# Patient Record
(demographics unavailable — no encounter records)

---

## 2017-02-16 NOTE — ED.PDOC
General


ED Provider: 


Dr. MARGOT BOSCH-ER





Chief Complaint: Toe Pain/Injury


Stated Complaint: hes got an infection


Time Seen by Physician: 17:13


Mode of Arrival: Walk-In


Information Source: Patient


Exam Limitations: No limitations


Primary Care Provider: 


PRAVEENA STEEL





Nursing and Triage Documentation Reviewed and Agree: Yes





Skin Complaint Exam





- Skin/Soft Tissue Complaint/Exam


Onset/Duration: 24hrs


Symptoms Are: Still present


Timing: Intermittent


Initial Severity: Mild


Current Severity: Mild


Location: right large toe


Character: Reports: Redness, Swelling, Raised


Aggravating: Reports: None


Alleviating: Reports: None


Associated Signs and Symptoms: Reports: Tenderness.  Denies: Fever, Chills, 

Itching, Drainage, Bruising, Red streaks, Joint swelling


Related Surgical History: Reports: None


Recent Exposure to Others w/Similar Symptoms: No


Skin Findings: Present: Erythema, Pustules


Joint Tenderness Present: No


Differential Diagnoses: Cellulitis, Other





Review of Systems





- Review Of Systems


Constitutional: Reports: No symptoms


Eyes: Reports: No symptoms


Ears, Nose, Mouth, Throat: Reports: No symptoms


Respiratory: Reports: No symptoms


Cardiovascular: Reports: No symptoms


Gastrointestinal: Reports: No symptoms, Other


Genitourinary: Reports: No symptoms


Musculoskeletal: Reports: No symptoms


Skin: Reports: Other


Neurological: Reports: No symptoms


All Other Systems: Reviewed and Negative





Past Medical History





- Past Medical History


Previously Healthy: Yes


Birth Weight: 7 lb 14 oz


Birth History: Normal


ENT: Reports: None


Respiratory: Reports: None


GI/: Reports: None


Chronic Illness: Reports: None





- Surgical History


General Surgical History: Reports: Appendectomy





- Family History


Family History: Reports: Unknown





- Social History


Smoking Status: Never smoker


Exposure to Passive Smoke: No


Infectious Exposure: No


Lives With: Parents





Physical Exam





- Physical Exam


Appearance: Well-appearing


Eyes: Conjunctiva clear


ENT: Ears normal, Nose normal, Mouth normal, Moist mucous membranes, Throat 

normal


Neck: Supple, Nontender, No Lymphadenopathy


Respiratory: Airway patent, Breath sounds clear, Breath sounds equal, 

Respirations nonlabored


Cardiovascular: RRR, No murmur, Pulses normal, Brisk capillary refill


GI/: Soft, Nontender, No masses, Bowel sounds normal, No Organomegaly


Musculoskeletal: Strength intact, ROM intact, No edema


Skin: Warm


Neurological: Alert, Muscle tone normal


Psychiatric: Responds appropriately





Critical Care Note





- Critical Care Note


Total Time (mins): 0





Course





- Course


Vital Signs: 





 











  Temp Pulse Resp Pulse Ox


 


 02/16/17 17:04  98.1 F  124  24  96














Departure





- Departure


Time of Disposition: 17:14


Disposition: HOME SELF-CARE


Discharge Problem: 


Cellulitis


Qualifiers:


 Site of cellulitis: unspecified site Qualifier Code: (L03.90) Cellulitis, 

unspecified





Instructions:  Cellulitis (ED)


Condition: Good


Pt referred to PMD for follow-up: Yes


Additional Instructions: 


cefzil 125/5 1 tsp bid x 7days--wash with soap and water bid --f/u with pcp


Allergies/Adverse Reactions: 


Allergies





No Known Allergies Allergy (Unverified 04/10/16 16:09)


 








Home Medications: 


Ambulatory Orders





1 [No Reported Medications]  04/10/16 








Disposition Discussed With: Patient, Family